# Patient Record
Sex: FEMALE | Race: WHITE | NOT HISPANIC OR LATINO | Employment: FULL TIME | URBAN - METROPOLITAN AREA
[De-identification: names, ages, dates, MRNs, and addresses within clinical notes are randomized per-mention and may not be internally consistent; named-entity substitution may affect disease eponyms.]

---

## 2021-05-22 ENCOUNTER — IMMUNIZATIONS (OUTPATIENT)
Dept: FAMILY MEDICINE CLINIC | Facility: HOSPITAL | Age: 26
End: 2021-05-22

## 2021-05-22 DIAGNOSIS — Z23 ENCOUNTER FOR IMMUNIZATION: Primary | ICD-10-CM

## 2021-05-22 PROCEDURE — 91300 SARS-COV-2 / COVID-19 MRNA VACCINE (PFIZER-BIONTECH) 30 MCG: CPT

## 2021-05-22 PROCEDURE — 0001A SARS-COV-2 / COVID-19 MRNA VACCINE (PFIZER-BIONTECH) 30 MCG: CPT

## 2021-06-16 ENCOUNTER — IMMUNIZATIONS (OUTPATIENT)
Dept: FAMILY MEDICINE CLINIC | Facility: HOSPITAL | Age: 26
End: 2021-06-16

## 2021-06-16 DIAGNOSIS — Z23 ENCOUNTER FOR IMMUNIZATION: Primary | ICD-10-CM

## 2021-06-16 PROCEDURE — 0002A SARS-COV-2 / COVID-19 MRNA VACCINE (PFIZER-BIONTECH) 30 MCG: CPT

## 2021-06-16 PROCEDURE — 91300 SARS-COV-2 / COVID-19 MRNA VACCINE (PFIZER-BIONTECH) 30 MCG: CPT

## 2021-09-03 ENCOUNTER — HOSPITAL ENCOUNTER (OUTPATIENT)
Dept: ULTRASOUND IMAGING | Facility: HOSPITAL | Age: 26
Discharge: HOME | End: 2021-09-03
Payer: COMMERCIAL

## 2021-09-03 DIAGNOSIS — R20.8 OTHER DISTURBANCES OF SKIN SENSATION (CODE): ICD-10-CM

## 2021-09-03 PROCEDURE — 76536 US EXAM OF HEAD AND NECK: CPT

## 2022-11-29 ENCOUNTER — OFFICE VISIT (OUTPATIENT)
Dept: URGENT CARE | Facility: CLINIC | Age: 27
End: 2022-11-29

## 2022-11-29 VITALS
TEMPERATURE: 98.8 F | HEART RATE: 82 BPM | DIASTOLIC BLOOD PRESSURE: 57 MMHG | RESPIRATION RATE: 20 BRPM | SYSTOLIC BLOOD PRESSURE: 116 MMHG

## 2022-11-29 DIAGNOSIS — H02.89 PAIN OF LEFT EYELID: Primary | ICD-10-CM

## 2022-11-29 RX ORDER — ERYTHROMYCIN 5 MG/G
0.5 OINTMENT OPHTHALMIC
Qty: 3.5 G | Refills: 0 | Status: SHIPPED | OUTPATIENT
Start: 2022-11-29 | End: 2022-12-06

## 2022-11-29 NOTE — PROGRESS NOTES
[facility]        NAME: Braulio Pinzon is a 24 y o  female  : 1995    MRN: [id number]  DATE: 2022  TIME: 6:04 PM    Assessment and Plan   Pain of left eyelid [H02 89]  1  Pain of left eyelid  erythromycin (ILOTYCIN) ophthalmic ointment       51-year-old female presents for evaluation of left upper lateral eyelid pain  No obvious foreign body or corneal abrasion on fluorescein stain exam   Patient advised to apply warm compresses, erythromycin ointment ordered  Follow-up with ophthalmologist as soon as possible  Present for immediate re-evaluation if you experience eye pain, redness or swelling of the eyelid  Patient Instructions    Blepharitis   WHAT YOU NEED TO KNOW:   Blepharitis is inflammation of one or both eyelids  Your eyelid, eyelashes, oil glands, or whites of the eye may be affected  DISCHARGE INSTRUCTIONS:   Return to the emergency department if:   • You have severe pain      • You have vision loss      Call your doctor or ophthalmologist if:   • Your vision changes      • Your signs and symptoms return or get worse, even after treatment      • You have a lump on your eyelid      • You have a pus-filled sore on your eyelid      • You have questions or concerns about your condition or care      Medicines:   • Medicines  can help decrease pain and swelling, or treat an infection      • Take your medicine as directed  Contact your healthcare provider if you think your medicine is not helping or if you have side effects  Tell him or her if you are allergic to any medicine  Keep a list of the medicines, vitamins, and herbs you take  Include the amounts, and when and why you take them  Bring the list or the pill bottles to follow-up visits   Carry your medicine list with you in case of an emergency      Manage blepharitis:  Always wash your hands with soap and water before and after eye care:     • Use artificial tears  2 times each day if you have dry eye      • Apply a warm compress  for 10 minutes 1 to 2 times each day, or as directed  This will loosen crusts and decrease itching and burning      • Gently scrub your upper and lower eyelid  2 times each day  This will help open your clogged oil glands and remove pus or other material stuck to your eyelid  Your healthcare provider may recommend a cleaning solution to buy  You can instead make one by putting 2 to 3 drops of baby shampoo in ½ cup warm water      • Massage your upper and lower eyelid in small circles for 5 seconds  to loosen oil plugs and decrease inflammation      • Do not wear contact lenses or eye makeup  until your eye has healed      Follow up with your doctor or ophthalmologist as directed:  Write down your questions so you remember to ask them during your visits  © Copyright [employer] 2022 Information is for End User's use only and may not be sold, redistributed or otherwise used for commercial purposes  All illustrations and images included in CareNotes® are the copyrighted property of [employer]  or [employer]   The above information is an  only  It is not intended as medical advice for individual conditions or treatments  Talk to your doctor, nurse or pharmacist before following any medical regimen to see if it is safe and effective for you            Follow up with PCP in 3-5 days  Proceed to  ER if symptoms worsen  Chief Complaint     Chief Complaint   Patient presents with   • Eye Problem     Discomfort in L upper eyelid which started a few days ago  No visual issues  History of Present Illness       Patient is a 82-year-old female with no significant past medical history presents for evaluation of left lateral upper eyelid pain since Sunday night  She wears glasses, but does not wear contacts  She denies injury or trauma to the eye, blurred vision or other vision changes, pain with eye movement, drainage from the eye    The pain is aggravated by closing her eye       Review of Systems   Review of Systems   Constitutional: Negative for fatigue and fever  HENT: Negative for congestion, ear discharge, ear pain, postnasal drip, rhinorrhea, sinus pressure, sinus pain, sneezing and sore throat  Eyes: Negative  Negative for photophobia, pain, discharge, redness, itching and visual disturbance  Eyelid pain   Respiratory: Negative  Negative for apnea, cough, choking, chest tightness, shortness of breath, wheezing and stridor  Cardiovascular: Negative  Negative for chest pain and palpitations  Gastrointestinal: Negative  Negative for diarrhea, nausea and vomiting  Endocrine: Negative  Negative for polydipsia, polyphagia and polyuria  Genitourinary: Negative  Negative for decreased urine volume and flank pain  Musculoskeletal: Negative  Negative for arthralgias, back pain, myalgias, neck pain and neck stiffness  Skin: Negative  Negative for color change and rash  Allergic/Immunologic: Negative  Negative for environmental allergies  Neurological: Negative  Negative for dizziness, facial asymmetry, light-headedness, numbness and headaches  Hematological: Negative  Negative for adenopathy  Psychiatric/Behavioral: Negative  Current Medications       Current Outpatient Medications:   •  erythromycin (ILOTYCIN) ophthalmic ointment, Administer 0 5 inches into the left eye daily at bedtime for 7 days, Disp: 3 5 g, Rfl: 0  •  sertraline (ZOLOFT) 50 mg tablet, Take 50 mg by mouth daily, Disp: , Rfl:     Current Allergies     Allergies as of 11/29/2022   • (No Known Allergies)            The following portions of the patient's history were reviewed and updated as appropriate: allergies, current medications, past family history, past medical history, past social history, past surgical history and problem list      No past medical history on file  No past surgical history on file  No family history on file        Medications have been verified  Objective   /57   Pulse 82   Temp 98 8 °F (37 1 °C) (Temporal)   Resp 20        Physical Exam     Physical Exam  Vitals and nursing note reviewed  Constitutional:       General: She is not in acute distress  Appearance: Normal appearance  She is not ill-appearing, toxic-appearing or diaphoretic  Interventions: She is not intubated  HENT:      Head: Normocephalic and atraumatic  Right Ear: Tympanic membrane, ear canal and external ear normal  There is no impacted cerumen  Left Ear: Tympanic membrane, ear canal and external ear normal  There is no impacted cerumen  Nose: Nose normal  No congestion or rhinorrhea  Mouth/Throat:      Mouth: Mucous membranes are moist       Pharynx: No oropharyngeal exudate or posterior oropharyngeal erythema  Eyes:      General: Lids are normal  Lids are everted, no foreign bodies appreciated  Left eye: No foreign body, discharge or hordeolum  Extraocular Movements: Extraocular movements intact  Left eye: Normal extraocular motion and no nystagmus  Conjunctiva/sclera: Conjunctivae normal       Left eye: Left conjunctiva is not injected  No chemosis, exudate or hemorrhage  Pupils: Pupils are equal, round, and reactive to light  Cardiovascular:      Rate and Rhythm: Normal rate and regular rhythm  Pulses: Normal pulses  Heart sounds: Normal heart sounds, S1 normal and S2 normal  Heart sounds not distant  No murmur heard  No friction rub  No gallop  Pulmonary:      Effort: Pulmonary effort is normal  No tachypnea, bradypnea, accessory muscle usage, prolonged expiration, respiratory distress or retractions  She is not intubated  Breath sounds: Normal breath sounds  No stridor, decreased air movement or transmitted upper airway sounds  No decreased breath sounds, wheezing, rhonchi or rales  Abdominal:      General: Bowel sounds are normal       Palpations: Abdomen is soft  Tenderness: There is no abdominal tenderness  There is no guarding or rebound  Musculoskeletal:         General: Normal range of motion  Cervical back: Full passive range of motion without pain, normal range of motion and neck supple  No rigidity or tenderness  No spinous process tenderness or muscular tenderness  Normal range of motion  Lymphadenopathy:      Cervical: No cervical adenopathy  Right cervical: No superficial cervical adenopathy  Left cervical: No superficial cervical adenopathy  Skin:     General: Skin is warm and dry  Capillary Refill: Capillary refill takes less than 2 seconds  Neurological:      General: No focal deficit present  Mental Status: She is alert and oriented to person, place, and time  Cranial Nerves: No cranial nerve deficit     Psychiatric:         Mood and Affect: Mood normal          Behavior: Behavior normal

## 2023-05-12 ENCOUNTER — OFFICE VISIT (OUTPATIENT)
Dept: URGENT CARE | Facility: CLINIC | Age: 28
End: 2023-05-12

## 2023-05-12 VITALS
TEMPERATURE: 98.4 F | DIASTOLIC BLOOD PRESSURE: 52 MMHG | WEIGHT: 120 LBS | OXYGEN SATURATION: 96 % | RESPIRATION RATE: 16 BRPM | BODY MASS INDEX: 21.95 KG/M2 | SYSTOLIC BLOOD PRESSURE: 104 MMHG | HEART RATE: 86 BPM

## 2023-05-12 DIAGNOSIS — R51.9 NONINTRACTABLE HEADACHE, UNSPECIFIED CHRONICITY PATTERN, UNSPECIFIED HEADACHE TYPE: Primary | ICD-10-CM

## 2023-05-12 NOTE — PROGRESS NOTES
"  [facility]        NAME: Janine Jensen is a 24 y o  female  : 1995    MRN: [id number]  DATE: May 12, 2023  TIME: 7:35 PM    Assessment and Plan   Nonintractable headache, unspecified chronicity pattern, unspecified headache type [R51 9]  1  Nonintractable headache, unspecified chronicity pattern, unspecified headache type              Patient Instructions       Follow up with PCP in 3-5 days  Proceed to  ER if symptoms worsen  Chief Complaint     Chief Complaint   Patient presents with   • Headache     States she has been getting headaches for the last couple months  States she feels lightheaded when she has one  Does not have a headache now  Feels she may be anemic  Concerns about being pale  History of Present Illness       Patient is a 82-year-old female presenting for evaluation of daily headaches and fatigue  She states she has been having a headache daily for the past several months  It is intermittent and is primarily in the left frontal area  Over the past 1 to 2 weeks she has developed fatigue  She reports chills but denies fever  Denies arthralgias or myalgias  No visual changes  No recent cold symptoms  She uses Advil that \"sometimes\" offers relief  Headache generally improves with rest   She is concerned that she has anemia and has scheduled a follow-up visit with her family doctor but is unable to be evaluated until   Review of Systems   Review of Systems   Constitutional: Positive for fatigue  Negative for activity change, chills and fever  HENT: Negative for congestion, ear pain, postnasal drip, rhinorrhea, sinus pain and sore throat  Gastrointestinal: Negative for abdominal pain, diarrhea, nausea and vomiting  Skin: Negative for rash  Neurological: Positive for headaches           Current Medications       Current Outpatient Medications:   •  sertraline (ZOLOFT) 50 mg tablet, Take 50 mg by mouth daily, Disp: , Rfl:     Current Allergies " Allergies as of 05/12/2023   • (No Known Allergies)            The following portions of the patient's history were reviewed and updated as appropriate: allergies, current medications, past family history, past medical history, past social history, past surgical history and problem list      No past medical history on file  No past surgical history on file  No family history on file  Medications have been verified  Objective   /52   Pulse 86   Temp 98 4 °F (36 9 °C) (Temporal)   Resp 16   Wt 54 4 kg (120 lb)   SpO2 96%   BMI 21 95 kg/m²        Physical Exam     Physical Exam  Vitals reviewed  Constitutional:       General: She is awake  She is not in acute distress  Appearance: Normal appearance  She is normal weight  HENT:      Head: Normocephalic  Right Ear: Hearing normal       Left Ear: Hearing normal    Cardiovascular:      Rate and Rhythm: Normal rate  Pulmonary:      Effort: Pulmonary effort is normal    Skin:     General: Skin is warm and moist    Neurological:      General: No focal deficit present  Mental Status: She is alert and oriented to person, place, and time  Psychiatric:         Behavior: Behavior is cooperative

## 2023-11-28 NOTE — PROGRESS NOTES
Diagnoses and all orders for this visit:    Encounter for annual routine gynecological examination  -     Liquid-based pap, screening        Perineal hygiene reviewed   Weight bearing exercises minium of 150 mins/weekly advised. Kegel exercises recommended daily, see AVS for instructions and recommendations  SBE encouraged, ASCCP guidelines reviewed. Condoms encouraged with all sexual activity to prevent STI's. Gardisil vaccines recommended up to age 39  Calcium/ Vit D dietary requirements discussed,   Advised to call with any issues,  all concerns & questions addressed. See provided information in your after visit summary     F/U Annually and PRN      Health Maintenance:    Last PAP: Not on file Not Collect Today, unable to tolerate speculum exam will try at annual next year, we discussed her trying to use a tampon throughout the year to help her feel more comfortable with penetration       Last Mammogram: Not on file  advised age 38     Last Colonoscopy: Not on file    advised age 41    Gardisil: Not completed Gardasil 9 was advised for prevention of HPV-related disease. We discussed risks/benefits, SE's/AE's at length and all questions were answered. Written info was provided for review. The patient agrees to consider and is aware she may call to schedule injection #1 at any time. Subjective    CC: Yearly Exam, New Patient      Salbador Lopez is a 34 y.o. female here for an annual exam. No obstetric history on file. GYN hx includes:  no issues   No personal Hx of breast, cervical, ovarian or colon CA.    Family hx of: PGM- breast cancer early 42's alive, no genetic testing done   Genetic oncology referral placed for patient   Medically stable, reports no changes in medical Hx, follows with PMD  Pt s mother at visit today, pt does have anxiety and tactile issues, she was not able to tolerate the speculum today, pediatric spec attempted, we discussed trying at next years visit, Latha Lema has never been sexually active and does not have any pertinent risk factors at this time, ok to wait until next year   Patient's last menstrual period was 11/15/2023 (exact date). Her menstrual cycles are regular every 28-30 days. She denies issues with bleeding during her menses. She denies breast concerns, abnormal vaginal discharge, vaginal itching, odor, irritation, bowel/bladder dysfunction, urinary symptoms, pelvic pain,  She is not sexually active. Never has been  She does not want STD testing today. Live at home   Works at [employer]     History reviewed. No pertinent past medical history. Past Surgical History:   Procedure Laterality Date    WISDOM TOOTH EXTRACTION         Immunization History   Administered Date(s) Administered    COVID-19 PFIZER VACCINE 0.3 ML IM 2021, 2021       Family History   Problem Relation Age of Onset    No Known Problems Mother     No Known Problems Father     No Known Problems Brother      Social History     Tobacco Use    Smoking status: Never    Smokeless tobacco: Never   Vaping Use    Vaping Use: Never used   Substance Use Topics    Drug use: Never       Current Outpatient Medications:     sertraline (ZOLOFT) 50 mg tablet, Take 50 mg by mouth daily, Disp: , Rfl:   Patient Active Problem List    Diagnosis Date Noted    Anxiety 2023       No Known Allergies    OB History    Para Term  AB Living   0 0 0 0 0 0   SAB IAB Ectopic Multiple Live Births   0 0 0 0 0       Vitals:    23 0902   BP: 118/70   BP Location: Left arm   Patient Position: Sitting   Cuff Size: Large   Weight: 59.9 kg (132 lb)   Height: 5' 2" (1.575 m)     Body mass index is 24.14 kg/m². Review of Systems     Constitutional: Negative for chills, fatigue, fever, headaches, visual disturbances, and unexpected weight change. Respiratory: Negative for cough, & shortness of breath. Cardiovascular: Negative for chest pain.  .    Gastrointestinal: Negative for Abd pain, nausea & vomiting, constipation and diarrhea. Genitourinary: Negative for difficulty urinating, dysuria, hematuria, dyspareunia, unusual vaginal bleeding or discharge  Skin: Negative skin changes    Physical Exam     Constitutional: Alert & Oriented x3, well-developed and well-nourished. No distress. HENT: Atraumatic, Normocephalic, Conjunctivae clear  Neck: Normal range of motion. Neck supple. No thyromegaly, mass, nodules or tenderness  Pulmonary: Effort normal.   Abdominal: Soft. No tenderness or masses  Musculoskeletal: Normal ROM  Skin: Warm & Dry  Psychological: Normal mood, thought content, behavior & judgement     Breasts:   Right: tissue soft without masses, tenderness, skin changes or nipple discharge. No areas of erythema or pain. No subclavicular, axillary, pectoral adenopathy  Left:  tissue soft without masses, tenderness, skin changes or nipple discharge. No areas of erythema or pain. No subclavicular, axillary, pectoral adenopathy    Pelvic exam was performed with patient supine, lithotomy position. Labia: Negative rash, tenderness, lesion or injury on the right labia. Negative rash, tenderness, lesion or injury on the left labia. Urethral meatus:  Negative for  tenderness, inflammation or discharge. Unable to tolerate speculum exam   Uterus:   Cervix: . Left adnexa:   Vagina:   Perineum without lesions, signs of injury, erythema or swelling. Inguinal Canal:        Right: No inguinal adenopathy or hernia present. Left: No inguinal adenopathy or hernia present.

## 2023-11-29 PROBLEM — F41.9 ANXIETY: Status: ACTIVE | Noted: 2023-11-29

## 2023-11-29 NOTE — PATIENT INSTRUCTIONS
Breast Self Exam for Women   AMBULATORY CARE:   A breast self-exam (BSE)  is a way to check your breasts for lumps and other changes. Regular BSEs can help you know how your breasts normally look and feel. Most breast lumps or changes are not cancer, but you should always have them checked by a healthcare provider. Why you should do a BSE:  Breast cancer is the most common type of cancer in women. Even if you have mammograms, you may still want to do a BSE regularly. If you know how your breasts normally feel and look, it may help you know when to contact your healthcare provider. Mammograms can miss some cancers. You may find a lump during a BSE that did not show up on a mammogram.  When you should do a BSE:  If you have periods, you may want to do your BSE 1 week after your period ends. This is the time when your breasts may be the least swollen, lumpy, or tender. You can do regular BSEs even if you are breastfeeding or have breast implants. Call your doctor if:   You find any lumps or changes in your breasts. You have breast pain or fluid coming from your nipples. You have questions or concerns about your condition or care. How to do a BSE:       Look at your breasts in a mirror. Look at the size and shape of each breast and nipple. Check for swelling, lumps, dimpling, scaly skin, or other skin changes. Look for nipple changes, such as a nipple that is painful or beginning to pull inward. Gently squeeze both nipples and check to see if fluid (that is not breast milk) comes out of them. If you find any of these or other breast changes, contact your healthcare provider. Check your breasts while you sit or  the following 3 positions:    Frederic Net your arms down at your sides. Raise your hands and join them behind your head. Put firm pressure with your hands on your hips. Bend slightly forward while you look at your breasts in the mirror. Lie down and feel your breasts.   When you lie down, your breast tissue spreads out evenly over your chest. This makes it easier for you to feel for lumps and anything that may not be normal for your breasts. Do a BSE on one breast at a time. Place a small pillow or towel under your left shoulder. Put your left arm behind your head. Use the 3 middle fingers of your right hand. Use your fingertip pads, on the top of your fingers. Your fingertip pad is the most sensitive part of your finger. Use small circles to feel your breast tissue. Use your fingertip pads to make dime-sized, overlapping circles on your breast and armpits. Use light, medium, and firm pressure. First, press lightly. Second, press with medium pressure to feel a little deeper into the breast. Last, use firm pressure to feel deep within your breast.    Examine your entire breast area. Examine the breast area from above the breast to below the breast where you feel only ribs. Make small circles with your fingertips, starting in the middle of your armpit. Make circles going up and down the breast area. Continue toward your breast and all the way across it. Examine the area from your armpit all the way over to the middle of your chest (breastbone). Stop at the middle of your chest.    Move the pillow or towel to your right shoulder, and put your right arm behind your head. Use the 3 fingertip pads of your left hand, and repeat the above steps to do a BSE on your right breast.  What else you can do to check for breast problems or cancer:  Talk to your healthcare provider about mammograms. A mammogram is an x-ray of your breasts to screen for breast cancer or other problems. Your provider can tell you the benefits and risks of mammograms. The first mammogram is usually at age 39 or 48. Your provider may recommend you start at 36 or younger if your risk for breast cancer is high. Mammograms usually continue every 1 to 2 years until age 76.        Follow up with your doctor as directed:  Write down your questions so you remember to ask them during your visits. © Copyright Yolanda Martínez 2023 Information is for End User's use only and may not be sold, redistributed or otherwise used for commercial purposes. The above information is an  only. It is not intended as medical advice for individual conditions or treatments. Talk to your doctor, nurse or pharmacist before following any medical regimen to see if it is safe and effective for you. Wellness Visit for Adults   AMBULATORY CARE:   A wellness visit  is when you see your healthcare provider to get screened for health problems. Your healthcare provider will also give you advice on how to stay healthy. Write down your questions so you remember to ask them. Ask your healthcare provider how often you should have a wellness visit. What happens at a wellness visit:  Your healthcare provider will ask about your health, and your family history of health problems. This includes high blood pressure, heart disease, and cancer. He or she will ask if you have symptoms that concern you, if you smoke, and about your mood. You may also be asked about your intake of medicines, supplements, food, and alcohol. Any of the following may be done: Your weight  will be checked. Your height may also be checked so your body mass index (BMI) can be calculated. Your BMI shows if you are at a healthy weight. Your blood pressure  and heart rate will be checked. Your temperature may also be checked. Blood and urine tests  may be done. Blood tests may be done to check your cholesterol levels. Abnormal cholesterol levels increase your risk for heart disease and stroke. You may also need a blood or urine test to check for diabetes if you are at increased risk. Urine tests may be done to look for signs of an infection or kidney disease. A physical exam  includes checking your heartbeat and lungs with a stethoscope.  Your healthcare provider may also check your skin to look for sun damage. Screening tests  may be recommended. A screening test is done to check for diseases that may not cause symptoms. The screening tests you may need depend on your age, gender, family history, and lifestyle habits. For example, colorectal screening may be recommended if you are 48years old or older. Screening tests you need if you are a woman:   A Pap smear  is used to screen for cervical cancer. Pap smears are usually done every 3 to 5 years depending on your age. You may need them more often if you have had abnormal Pap smear test results in the past. Ask your healthcare provider how often you should have a Pap smear. A mammogram  is an x-ray of your breasts to screen for breast cancer. Experts recommend mammograms every 2 years starting at age 48 years. You may need a mammogram at age 52 years or younger if you have an increased risk for breast cancer. Talk to your healthcare provider about when you should start having mammograms and how often you need them. Vaccines you may need:   Get an influenza vaccine  every year. The influenza vaccine protects you from the flu. Several types of viruses cause the flu. The viruses change over time, so new vaccines are made each year. Get a tetanus-diphtheria (Td) booster vaccine  every 10 years. This vaccine protects you against tetanus and diphtheria. Tetanus is a severe infection that may cause painful muscle spasms and lockjaw. Diphtheria is a severe bacterial infection that causes a thick covering in the back of your mouth and throat. Get a human papillomavirus (HPV) vaccine  if you are female and aged 23 to 32 or male 23 to 24 and never received it. This vaccine protects you from HPV infection. HPV is the most common infection spread by sexual contact. HPV may also cause vaginal, penile, and anal cancers. Get a pneumococcal vaccine  if you are aged 72 years or older.  The pneumococcal vaccine is an injection given to protect you from pneumococcal disease. Pneumococcal disease is an infection caused by pneumococcal bacteria. The infection may cause pneumonia, meningitis, or an ear infection. Get a shingles vaccine  if you are 60 or older, even if you have had shingles before. The shingles vaccine is an injection to protect you from the varicella-zoster virus. This is the same virus that causes chickenpox. Shingles is a painful rash that develops in people who had chickenpox or have been exposed to the virus. How to eat healthy:  My Plate is a model for planning healthy meals. It shows the types and amounts of foods that should go on your plate. Fruits and vegetables make up about half of your plate, and grains and protein make up the other half. A serving of dairy is included on the side of your plate. The amount of calories and serving sizes you need depends on your age, gender, weight, and height. Examples of healthy foods are listed below:  Eat a variety of vegetables  such as dark green, red, and orange vegetables. You can also include canned vegetables low in sodium (salt) and frozen vegetables without added butter or sauces. Eat a variety of fresh fruits , canned fruit in 100% juice, frozen fruit, and dried fruit. Include whole grains. At least half of the grains you eat should be whole grains. Examples include whole-wheat bread, wheat pasta, brown rice, and whole-grain cereals such as oatmeal.    Eat a variety of protein foods such as seafood (fish and shellfish), lean meat, and poultry without skin (turkey and chicken). Examples of lean meats include pork leg, shoulder, or tenderloin, and beef round, sirloin, tenderloin, and extra lean ground beef. Other protein foods include eggs and egg substitutes, beans, peas, soy products, nuts, and seeds. Choose low-fat dairy products such as skim or 1% milk or low-fat yogurt, cheese, and cottage cheese. Limit unhealthy fats  such as butter, hard margarine, and shortening. Exercise:  Exercise at least 30 minutes per day on most days of the week. Some examples of exercise include walking, biking, dancing, and swimming. You can also fit in more physical activity by taking the stairs instead of the elevator or parking farther away from stores. Include muscle strengthening activities 2 days each week. Regular exercise provides many health benefits. It helps you manage your weight, and decreases your risk for type 2 diabetes, heart disease, stroke, and high blood pressure. Exercise can also help improve your mood. Ask your healthcare provider about the best exercise plan for you. General health and safety guidelines:   Do not smoke. Nicotine and other chemicals in cigarettes and cigars can cause lung damage. Ask your healthcare provider for information if you currently smoke and need help to quit. E-cigarettes or smokeless tobacco still contain nicotine. Talk to your healthcare provider before you use these products. Limit alcohol. A drink of alcohol is 12 ounces of beer, 5 ounces of wine, or 1½ ounces of liquor. Lose weight, if needed. Being overweight increases your risk of certain health conditions. These include heart disease, high blood pressure, type 2 diabetes, and certain types of cancer. Protect your skin. Do not sunbathe or use tanning beds. Use sunscreen with a SPF 15 or higher. Apply sunscreen at least 15 minutes before you go outside. Reapply sunscreen every 2 hours. Wear protective clothing, hats, and sunglasses when you are outside. Drive safely. Always wear your seatbelt. Make sure everyone in your car wears a seatbelt. A seatbelt can save your life if you are in an accident. Do not use your cell phone when you are driving. This could distract you and cause an accident. Pull over if you need to make a call or send a text message. Practice safe sex. Use latex condoms if are sexually active and have more than one partner.  Your healthcare provider may recommend screening tests for sexually transmitted infections (STIs). Wear helmets, lifejackets, and protective gear. Always wear a helmet when you ride a bike or motorcycle, go skiing, or play sports that could cause a head injury. Wear protective equipment when you play sports. Wear a lifejacket when you are on a boat or doing water sports. © Copyright Deanne Sin 2023 Information is for End User's use only and may not be sold, redistributed or otherwise used for commercial purposes. The above information is an  only. It is not intended as medical advice for individual conditions or treatments. Talk to your doctor, nurse or pharmacist before following any medical regimen to see if it is safe and effective for you. Safe Sex Practices   AMBULATORY CARE:   Safe sex practices  are ways to prevent pregnancy and the spread of sexually transmitted infections (STIs). An STI happens when a virus or bacteria are spread through sexual activity. Safe sex practices help decrease or prevent body fluid exchange during sex. Body fluids include saliva, urine, blood, vaginal fluids, and semen. Oral, vaginal, and anal sex can all spread STIs. Seek care immediately if:   A condom breaks, leaks, or slips off while you are having sex. You notice sores on your penis, vagina, anal area, or skin around them. You have had unsafe sex and want to discuss emergency contraception or treatment for STI exposure. Call your doctor if:   You or your female sex partner might be pregnant. You have questions or concerns about your condition or care. Safe sex practices to follow before you have sex:   Talk to a new partner before you have sex. Tell your partner if you have an STI. Ask about his or her sex history and if he or she has a current or past STI. Your partner may need to be tested and treated.  Do not have sex while you are being treated for an STI, or with a partner who is being treated. Limit your number of sex partners. More than one sex partner can increase your risk for an STI. Do not have sex with anyone whose sex history you do not know. Get tested for STIs if needed. Get tested if you had sex with someone who has an STI. Get tested if you have unprotected sex with any new partner. Talk to your healthcare provider about birth control. Birth control can help prevent an unwanted pregnancy. There are many different types of birth control. Talk to your healthcare provider about which birth control method is right for you. Ask about medicines to lower your risk for some STIs:      Vaccines  can help protect you from hepatitis A, hepatitis B, and the human papillomavirus (HPV). The HPV vaccine is usually given at 11 years, but it may be given through 26 years to both females and males. Your provider can give you more information on vaccines to prevent STIs. Pre-exposure prophylaxis (PrEP)  may be given if you are at high risk for HIV. PrEP is taken every day to prevent the virus from fully infecting the body. Do not use alcohol or drugs before sex. These can prevent you from thinking clearly and increase your risk for unsafe sex. Safe sex practices to follow while you are having sex:   Use condoms and barrier methods for all types of sexual contact. This includes oral, vaginal, and anal sex. Male and female condoms are available. Make sure that the condom fits and is put on correctly. Rubber latex sheets or dental dams can be used for oral sex. Use a new condom or latex barrier each time you have sex. Use latex condoms, if possible. [place] or natural condoms do not prevent STIs. If you or your partner is allergic to latex, use a nonlatex product, such as polyurethane. Use a second form of birth control with the condom to prevent pregnancy and STIs. Do not use male and female condoms together. Only use water-based lubricants during sex.   Water-based lubricants help prevent sores or cuts in the vagina or on the penis. Prevent sores or cuts to decrease your risk for an STI. Do not use oil-based lubricants, such as baby oil or hand lotion, with latex condoms or barriers. These will weaken the latex and may cause the condom to break. Do not use chemicals that irritate your skin. Products that contain chemical irritants, such as spermicides, can irritate the lining of your vagina or rectum. Irritation may cause sores that can increase your risk for an STI. Be careful when you have sex if you have open sores or cuts. Open sores or cuts may increase your risk for an STI. Keep all open sores or cuts covered during sex. Do not have oral sex if you have cuts or sores in your mouth. Do not do activities that can pass germs. Do not use saliva as a lubricant or share sex toys. Follow up with your doctor as directed:  Write down your questions so you remember to ask them during your visits. © Copyright Samara Walker 2023 Information is for End User's use only and may not be sold, redistributed or otherwise used for commercial purposes. The above information is an  only. It is not intended as medical advice for individual conditions or treatments. Talk to your doctor, nurse or pharmacist before following any medical regimen to see if it is safe and effective for you. HPV (Human Papillomavirus)   AMBULATORY CARE:   Human Papillomavirus (HPV)  is the name for a group of viruses that can infect your skin or other parts of your body. HPV is the most common infection spread by sexual contact. It can also be spread from a mother to her baby during delivery. Common symptoms include the following:   Painless warts in your mouth or on your genitals    Genital or anal discharge, bleeding, itching, or pain    Pain when you urinate    Call your doctor if:   You have new or worsening symptoms. You have questions or concerns about your condition or care.     HPV diagnosis:  Your healthcare provider may use a vinegar liquid to help diagnose HPV genital warts. Women 27to 72years old can be checked for HPV during regular cervical cancer screenings. An HPV test checks for certain types of HPV that can cause changes in cervical cells. Without treatment, the changed cells can become cancer. An HPV test can be done every 5 years if the results show no infection. The test can be done with or without a Pap smear. A Pap smear checks for cancer or for abnormal cells that can become cancer. You may be tested for HPV if you have mouth or throat cancer. Treatment:  HPV cannot be cured, but an infection may go away on its own in about 2 years without causing problems. If the infection continues, some types of HPV can lead to health conditions that need to be treated. Examples include warts and certain cancers, especially squamous cell carcinoma (SCC). HPV-linked SCCs commonly develop in the anus, throat (called oropharyngeal cancer), cervix, vagina, penis, or mouth. HPV can also cause a type of cervical cancer called adenocarcinoma. Symptoms of any of these conditions may not develop for several years after you were exposed to HPV. You will need to be monitored closely. Ask your healthcare provider for more information about monitoring, conditions caused by HPV, and available treatments. Prevent an HPV infection:  HPV is usually spread through sexual activity. The following can help prevent infection:  Ask about the HPV vaccine. The HPV vaccine is given to females and males, usually at 6or 15years of age. It can be given from 9 years through 39years of age, if needed. It is most effective if given before sexual activity begins. Use a new condom, contraceptive barrier, or dental dam each time you have sex. This includes oral, vaginal, and anal sex. Talk to your healthcare provider if you have any questions about what to use or how to use it.     Follow up with your doctor as directed:  Write down your questions so you remember to ask them during your visits. © Copyright Kobi Key 2023 Information is for End User's use only and may not be sold, redistributed or otherwise used for commercial purposes. The above information is an  only. It is not intended as medical advice for individual conditions or treatments. Talk to your doctor, nurse or pharmacist before following any medical regimen to see if it is safe and effective for you. Perineal Hygiene      Your vaginal naturally takes care of its self, it is a self washing system, the less you mess the healthier it will be     No soaps or feminine wash to the vulva, these products can cause dermitis, bacterial infections and other vulvar problems. Use only water to cleanse, or water with Dove or [employer] if necessary. No scented lotions or products are advised in or near your vulva. Use only coconut oil for moisture if needed. No douching this may cause imbalance in your vaginal PH and further issues. If you wear panty liners, you may apply a thin coating of Vaseline, A&D ointment or coconut oil to the vulvar tissues as a skin barrier     [place]otton underware, loose fitting clothing  Only perfume-free, dye-free laundry detergent, use a second rinse cycle   Avoid fabric softeners/dryer sheets. Your partner should avoid the same products as well. Over the counter probiotic to restore vaginal della may be helpful as well, take daily. You may also look into Boric Acid vaginal suppositories to restore vaginal PH balance for up to 2 weeks as directed on the box. You may not use these if you are pregnant      For vaginal dryness: You may use:     Coconut oil (organic, pure, unscented) as needed for moisture or lubrication.  ( Do not use if allergic)       Replens moisture restore external comfort gel daily ( use as directed on the box)        Replens long lasting vaginal moisturizer  ( use as directed on the box)         For Vaginal Lubrication:          You may use:     Coconut oil (organic, pure, unscented) as a lubricant or another scent-free lubricant (Astroglide, Uberlube) if needed. Do not use coconut oil or silicone if using a condom as this may break down the integrity of the condom and cause an unplanned pregnancy              Do not use coconut oil if allergic               Replens silky smooth lubricant, premium silicone based lubricant for intercourse. ( use as directed, a small amount will provide an enhanced natural feeling)     Any premium over the counter vaginal lubricant water or silicone based. Silicone based will have more staying power.

## 2023-11-30 ENCOUNTER — TELEPHONE (OUTPATIENT)
Dept: OBGYN CLINIC | Facility: CLINIC | Age: 28
End: 2023-11-30

## 2023-11-30 ENCOUNTER — OFFICE VISIT (OUTPATIENT)
Dept: OBGYN CLINIC | Facility: CLINIC | Age: 28
End: 2023-11-30
Payer: COMMERCIAL

## 2023-11-30 VITALS
BODY MASS INDEX: 24.29 KG/M2 | HEIGHT: 62 IN | SYSTOLIC BLOOD PRESSURE: 118 MMHG | WEIGHT: 132 LBS | DIASTOLIC BLOOD PRESSURE: 70 MMHG

## 2023-11-30 DIAGNOSIS — Z80.3 FAMILY HX-BREAST MALIGNANCY: ICD-10-CM

## 2023-11-30 DIAGNOSIS — Z01.419 ENCOUNTER FOR ANNUAL ROUTINE GYNECOLOGICAL EXAMINATION: Primary | ICD-10-CM

## 2023-11-30 PROCEDURE — S0610 ANNUAL GYNECOLOGICAL EXAMINA: HCPCS | Performed by: OBSTETRICS & GYNECOLOGY

## 2023-11-30 NOTE — TELEPHONE ENCOUNTER
Pt's mom requesting a cb from [employer]. Daughter has sensory issues and pap wasn't able to be done today so pt's mom is asking how insurance will be billed for visit. Pt's mom would like to talk to [employer] even if she can't answer the billing question.

## 2023-12-01 ENCOUNTER — TELEPHONE (OUTPATIENT)
Dept: HEMATOLOGY ONCOLOGY | Facility: CLINIC | Age: 28
End: 2023-12-01

## 2023-12-01 NOTE — TELEPHONE ENCOUNTER
I called Concha Vargas to schedule a new patient appointment with the [facility].      Outcome:   I left a voice message encouraging the patient to call the [facility] [place] at [phone] to schedule this appointment. A mychart message was also send with our contact information. Follow-up:   At this time the referral will be closed and we will wait to hear back from the patient regarding scheduling this appointment.

## 2023-12-06 NOTE — TELEPHONE ENCOUNTER
[facility] for [facility] that if she comes in sooner than the year for her daughter to try Pap smear again, we would do OVS as no charge, if pap obtained we would bill that portion to the lab as a covered service.

## 2024-08-19 ENCOUNTER — NURSE TRIAGE (OUTPATIENT)
Age: 29
End: 2024-08-19

## 2024-08-19 NOTE — TELEPHONE ENCOUNTER
"RN placed a call to patient. Pt got period today. States has been going through a lot of stress due to grandfather's illness. RN advised stress can impact cycle and make it irregular or even missed. Advised pt to wait and see if period regulates next month. If continues to be irregular or unusual - call back to schedule an appointment. Pt agreeable to plan. No further questions.  Reason for Disposition  • Recent stress (e.g., new school/job/home/marriage, relationship problems)    Answer Assessment - Initial Assessment Questions  1. LMP:  \"When did your last menstrual period begin?\"      NA  2. DAYS LATE: \"How many days late is your menstrual period?\"      9 days - but got today after call.   3. REGULARITY: \"How regular are your periods?\"      Regular  4. PREGNANCY: \"Is there any chance you are pregnant?\" (e.g., unprotected intercourse, missed birth control pill, broken condom) \"Have you used a home pregnancy test?\"      Denies  5. BREASTFEEDING: \"Are you breastfeeding?\"      NA  6. BIRTH CONTROL PILLS: \"Are you taking birth control pills, or have you stopped recently?\"      NA  7. DEPOPROVERA: \"Has your doctor given you a shot to prevent pregnancy?\" (e.g., Depoprovera injection)      NA  8. CAUSE: \"What do you think caused the missed period?\" (e.g., stress, rapid weight loss, excessive exercise)      Stress  9. OTHER SYMPTOMS: \"Do you have any other symptoms?\" (e.g., abdominal pain)      NA    Protocols used: Menstrual Period - Missed or Late-ADULT-[place]    "

## 2024-08-19 NOTE — TELEPHONE ENCOUNTER
Patient returning call from RN, tried CTS unsuccessful. Patient states she did get her menstrual today. Please call patient back.

## 2024-08-19 NOTE — TELEPHONE ENCOUNTER
Regarding: pt is 9 days late and not pregnant  ----- Message from Brittany PENA sent at 8/19/2024  8:22 AM EDT -----  Patient called and is 9 days late and is not pregnant and she said she is a virgin and she's definitely not pregnant and concerned why she hasn't got her period and the openings we have she cannot make this week.  Please call patient back at [phone]. Thank you

## 2025-01-14 NOTE — PROGRESS NOTES
Diagnoses and all orders for this visit:    Encounter for gynecological examination without abnormal finding    HPV vaccine counseling  -     HPV Vaccine 9 valent IM    Encounter for initial prescription of contraceptive pills  -     norethindrone-ethinyl estradiol (JUNEL FE 1/20) 1-20 MG-MCG per tablet; Take 1 tablet by mouth daily    Screening for cervical cancer  -     Liquid-based pap, screening      She would like to start on OCP, Medical and family hx reviewed for risk of VTE. Discussed benefits, side effects and risks of OCP use. Reviewed ACHES. Printed information given & reviewed instructions for how to take the pill .  RTO in 3 months for pill check      Return for Gardisil 2 & 3     Perineal hygiene reviewed   Weight bearing exercises minium of 150 mins/weekly advised.   Kegel exercises recommended daily, see AVS for instructions and recommendations  SBE encouraged, ASCCP guidelines reviewed. Condoms encouraged with all sexual activity to prevent STI's.   Gardisil vaccines recommended up to age 45  Calcium/ Vit D dietary requirements discussed,   Advised to call with any issues,  all concerns & questions addressed.   See after visit summary for further information and recommendations to the above mentioned subjects which we may or may not have covered in detail during your visit       Health Maintenance:    Last PAP: Not on file  Next PAP Due:collected today, blind sweep , pt had a hard time tolerating but did much better than last year, she is aware that we may need to repeat the pap     Last Mammogram: Not on file  advised age 40     Last Colonoscopy: Not on file    advised age 47    Gardisil:  Not completed Gardasil 9 was advised for prevention of HPV-related disease. We discussed risks/benefits, SE's/AE's at length and all questions were answered. Written info was provided for review. The patient agrees to consider and is aware she may call to schedule injection #1 at any time.    Would like to start  today       Subjective    CC: Yearly Exam      Juany Hernandez is a 29 y.o. female here for an annual exam.   GYN hx includes:  non contributory   Family hx of: PGM with BC early 40's no genetic testing was done  pt was given genetic referral last annual   Medically stable, reports no changes in medical Hx, follows with PMD    Patient's last menstrual period was 2024 (approximate).  Her menstrual cycles are regular every 28-30 days. She denies issues with bleeding during her menses.   She denies breast concerns, abnormal vaginal discharge, vaginal itching, odor, irritation, bowel/bladder dysfunction, urinary symptoms, pelvic pain today.   She is not sexually active. Just started dating Cj, this is possibly a serious relationship, she wants to start BC so that she can be ready for a sexual relationship she feels he is the one.  She does not want STD testing today.  Denies intimate partner violence    Working at [employer]     History reviewed. No pertinent past medical history.  Past Surgical History:   Procedure Laterality Date    WISDOM TOOTH EXTRACTION         Immunization History   Administered Date(s) Administered    COVID-19 PFIZER VACCINE 0.3 ML IM 2021, 2021    HPV9 2025       Family History   Problem Relation Age of Onset    No Known Problems Mother     No Known Problems Father     No Known Problems Brother     Breast cancer Paternal Grandmother      Social History     Tobacco Use    Smoking status: Never    Smokeless tobacco: Never   Vaping Use    Vaping status: Never Used   Substance Use Topics    Alcohol use: Never    Drug use: Never       Current Outpatient Medications:     norethindrone-ethinyl estradiol (JUNEL FE 1/20) 1-20 MG-MCG per tablet, Take 1 tablet by mouth daily, Disp: 90 tablet, Rfl: 0    sertraline (ZOLOFT) 50 mg tablet, Take 50 mg by mouth daily, Disp: , Rfl:   Patient Active Problem List    Diagnosis Date Noted    Anxiety 2023       No Known  "Allergies    OB History    Para Term  AB Living   0 0 0 0 0 0   SAB IAB Ectopic Multiple Live Births   0 0 0 0 0       Vitals:    25 1227   BP: 102/58   BP Location: Left arm   Patient Position: Sitting   Cuff Size: Standard   Weight: 54.9 kg (121 lb)   Height: 5' 2\" (1.575 m)     Body mass index is 22.13 kg/m².    Review of Systems     Constitutional: Negative for chills, fatigue, fever, headaches, visual disturbances, and unexpected weight change.   Respiratory: Negative for cough, & shortness of breath.  Cardiovascular: Negative for chest pain. .    Gastrointestinal: Negative for Abd pain, nausea & vomiting, constipation and diarrhea.   Genitourinary: Negative for difficulty urinating, dysuria, hematuria, unusual vaginal bleeding or discharge  Skin: Negative skin changes    Physical Exam     Constitutional: Alert & Oriented x3, well-developed and well-nourished. No distress.   HENT: Atraumatic, Normocephalic, Conjunctivae clear  Neck: Normal range of motion. Neck supple. No thyromegaly, mass, nodules or tenderness  Pulmonary: Effort normal.   Abdominal: Soft. No tenderness or masses  Musculoskeletal: Normal ROM  Skin: Warm & Dry  Psychological: Normal mood, thought content, behavior & judgement     Breasts:   Right: tissue soft without masses, tenderness, skin changes or nipple discharge. No areas of erythema or pain. No subclavicular, axillary, pectoral adenopathy  Left:  tissue soft without masses, tenderness, skin changes or nipple discharge. No areas of erythema or pain. No subclavicular, axillary, pectoral adenopathy    Pelvic exam was performed with patient supine, lithotomy position.   Blind sweep as pt did not tolerate the spec well.     Labia: Negative rash, tenderness, lesion or injury on the right labia.              Negative rash, tenderness, lesion or injury on the left labia.   Urethral meatus:  Negative for  tenderness, inflammation or discharge.   Uterus: not deviated, " enlarged, fixed or tender.   Cervix: No CMT, no discharge or friability.   Right adnexa: no mass, no tenderness and no fullness.  Left adnexa: no mass, no tenderness and no fullness.   Vagina: No erythema, tenderness, masses, or foreign body in the vagina. No signs of injury around the vagina. No unusual vaginal discharge   Perineum without lesions, signs of injury, erythema or swelling.  Inguinal Canal:        Right: No inguinal adenopathy or hernia present.        Left: No inguinal adenopathy or hernia present.

## 2025-01-14 NOTE — PATIENT INSTRUCTIONS
Birth Control Pills     Birth control pills  are also called oral contraceptives, or the pill. It is medicine that helps prevent pregnancy by stopping ovulation. Ovulation is when the ovaries make and release an egg cell each month. If this egg gets fertilized by sperm, pregnancy occurs. You will need to take the pill at the same time every day. Your healthcare provider will tell you when to start taking the pill. You will also be told what to do if you miss a dose. Instructions will depend on the kind of birth control pills you are taking.   Different kinds of birth control pills:  Some kinds are taken for 21 days in a row, followed by 7 days of placebo (no hormones) pills. Other kinds are taken for 24 days followed by 4 days of placebos. Each kind has a certain amount of female hormones. Your provider will decide on the kind that is best for you based on your age and other health conditions.  Call your local emergency number (911 in the ) for any of the following:   You have any of the following signs of a stroke:      Numbness or drooping on one side of your face     Weakness in an arm or leg    Confusion or difficulty speaking    Dizziness, a severe headache, or vision loss    You feel lightheaded, short of breath, and have chest pain.    You cough up blood.    Seek care immediately if:   Your arm or leg feels warm, tender, and painful. It may look swollen and red.    You have severe pain, numbness, or swelling in your arms or legs.    Contact your healthcare provider if:   You have forgotten to take a birth control pill.    You have mood changes, such as depression, since starting birth control pills.    You have nausea or are vomiting.    You have severe abdominal pain.    You missed a period and have questions or concerns about being pregnant.    You still have bleeding 4 months after taking birth control pills correctly.    You have questions or concerns about your condition or care.    What may be done  before you can start taking birth control pills:  You need to see your healthcare provider to get a prescription. Any of the following may be done before your healthcare provider gives you a prescription:  Your healthcare provider will ask about diseases and illnesses you have had in the past. Your provider will check your risk for blood clots, heart conditions, or stroke. Tell your provider if you had gastric bypass surgery. This surgery can affect the way your body absorbs medicines such as birth control pills.    Your provider will also check your blood pressure, and may do a breast and pelvic exam. A Pap smear may also be done during the pelvic exam. This is a test to make sure you do not have abnormal changes on your cervix. You may need other tests, such as a urine test to make sure you are not pregnant.    Your provider will ask if you take any medicines and if you smoke. Smoking increases your risk for stroke, heart attack, or a blood clot in your lungs. If you smoke, you should not take certain kinds of birth control pills.    Advantages of birth control pills:  When birth control pills are used correctly, the chances of getting pregnant are very low. Birth control pills may help decrease bleeding and pain during your monthly period. They may also help prevent cancer of the uterus and ovaries.  Disadvantages of birth control pills:  You may have sudden changes in your mood or feelings while you take birth control pills. You may have nausea and a decreased sex drive. You may have an increased appetite and rapid weight gain. You may also have bleeding in between periods, less frequent periods, vaginal dryness, and breast pain. Birth control pills will not protect you from sexually transmitted infections. Rarely, some birth control pills can increase your risk for a blood clot. This may become life-threatening.  If you decide you want to get pregnant:  If you are planning to have a baby, ask your healthcare  provider when you may stop taking your birth control pills. It may take some time for you to start ovulating again. Ask your healthcare provider for more information about pregnancy after birth control pills.  When to start taking birth control pills after you have a baby:  If you are not breastfeeding, you may start taking birth control pills 3 weeks after you give birth. You may be able to take certain types of birth control pills if you are breastfeeding. These pills can be started from 6 weeks to 6 months after you give birth. Ask your healthcare provider for more information about when to start taking birth control pills after you give birth.  What you need to know about birth control pills and menopause:   Talk with your healthcare provider if you want to take birth control pills around menopause.     Around age 45, you will enter into perimenopause. This means your hormone levels are dropping and you are ovulating less often. You can still become pregnant during this time. The risk for problems, such as miscarriage, are higher if you become pregnant after age 45. Birth control pills will prevent pregnancy, and may also help prevent or relieve some signs and symptoms of menopause. Examples are hot flashes and mood swings.     Your provider will do tests when you are around age 50. The tests may show that you are in menopause. If the tests do not show menopause for sure, you may be able to continue taking the pill up to age 55. The decision will depend on your health and if you have any medical conditions, such as a blood clot.    Do not smoke:  Nicotine and other chemicals in cigarettes and cigars increase your risk for a blood clot while you use birth control pills. The risk is higher if you are also 35 or older. Ask your healthcare provider for information if you currently smoke and need help to quit. E-cigarettes or smokeless tobacco still contain nicotine. Talk to your healthcare provider before you use  these products.  Follow up with your healthcare provider as directed:  Write down your questions so you remember to ask them during your visits.  © Copyright [employer] 2020 Information is for End User's use only and may not be sold, redistributed or otherwise used for commercial purposes. All illustrations and images included in CareNotes® are the copyrighted property of [employer]. or [employer]  The above information is an  only. It is not intended as medical advice for individual conditions or treatments. Talk to your doctor, nurse or pharmacist before following any medical regimen to see if it is safe and effective for you.       Patient Education     Lowering Your Risk of Breast Cancer   About this topic   Breast cancer is a serious illness. Breast cancer is when abnormal cells grow and divide more quickly in your breast. These cells form a growth or tumor. The abnormal cells may enter nearby tissue and spread to other parts of the body. It is the type of cancer most often seen in women. Men can have breast cancer, but it is a rare condition.  General   Some things in your life may increase your risk of breast cancer. You may not be able to change some of these. Others you can control.  You are more likely to get breast cancer if you:  Have a mother, sister, or daughter who has had breast cancer  Have used hormones for menopause for more than 5 years  Have had radiation therapy to the breast or chest in the past  Are overweight or do not exercise  Had your first menstrual period before you were 11 years old  Went through menopause after age 55  Have never been pregnant or had your first child after age 35  Have had breast cancer before  Drink alcohol in any form  Have dense breasts  Are older in age  There is no certain way to prevent breast cancer. There are things you can do to lower your chances of having breast cancer.  Keep a healthy weight. Lose weight if you are overweight.  Being overweight raises your chances of having breast cancer.  Eat a healthy diet to maintain a healthy weight, such as more fruits, vegetables, and lean cuts of meat. Decrease the amount of saturated fat in your diet.  Exercise. Being active helps you keep a healthy weight.  Limit your alcohol intake or do not drink alcohol. The more alcohol you drink, the higher your risk.  Do not smoke cigarettes. Smoking can increase your risk of many types of cancer.  Breastfeed your baby. This may help protect you. The longer you breastfeed, the more protection you have.  Talk with your doctor about:  Limiting or stopping hormone therapy.  Taking certain drugs to prevent breast cancer. For women at high risk of having breast cancer, there are a few drugs that may lower your risk.  Surgery to prevent you from having breast cancer if you are very high risk.  When do I need to call the doctor?   Changes in your breasts  A lump or area in your breast that feels different  Discharge from your nipple  Skin on your breast is dimpled or indented  You have questions or concerns about your breasts  Helpful tips   Talk to your doctor about the best kind of breast cancer screening for you.  If you want to do self breast exams, have your doctor show you the right way to do them.  Tell your doctor of any abnormal finding.  Last Reviewed Date   2021-10-04  Consumer Information Use and Disclaimer   This generalized information is a limited summary of diagnosis, treatment, and/or medication information. It is not meant to be comprehensive and should be used as a tool to help the user understand and/or assess potential diagnostic and treatment options. It does NOT include all information about conditions, treatments, medications, side effects, or risks that may apply to a specific patient. It is not intended to be medical advice or a substitute for the medical advice, diagnosis, or treatment of a health care provider based on the health care  provider's examination and assessment of a patient’s specific and unique circumstances. Patients must speak with a health care provider for complete information about their health, medical questions, and treatment options, including any risks or benefits regarding use of medications. This information does not endorse any treatments or medications as safe, effective, or approved for treating a specific patient. [employer] and its affiliates disclaim any warranty or liability relating to this information or the use thereof. The use of this information is governed by the Terms of Use, available at https://www.[employer]/en/know/clinical-effectiveness-terms   Copyright   Copyright © 2024 [employer] and its affiliates and/or licensors. All rights reserved.       Perineal Hygiene      Your vaginal naturally takes care of its self, it is a self washing system, the less you mess the healthier it will be     No soaps or feminine wash to the vulva, these products can cause dermitis, bacterial infections and other vulvar problems.   Use only water to cleanse, or water with Dove or Dove Sensitive Skin Bar soap if necessary.    Avoid the use of washcloths, exfoliating brenton's, netted scrubbers, loofa's, use your hands only to cleanse the vulvar tissues    No scented lotions or products are advised in or near your vulva.    Use coconut oil in its solid form for moisture if needed.  No douching this may cause imbalance in your vaginal PH and further issues.    If you wear panty liners, you may apply a thin coating of Vaseline, A&D ointment or coconut oil to the vulvar tissues as a skin barrier     Cotton underware, loose fitting clothing  Only perfume-free, dye-free laundry detergent, use a second rinse cycle   Avoid fabric softeners/dryer sheets.       Your partner should avoid the same products as well.       Over the counter probiotic to restore vaginal della may be helpful as well, take daily.       You may also  look into Boric Acid vaginal suppositories to restore vaginal PH balance for up to 2 weeks as directed on the box. You may not use these if you are pregnant      For vaginal dryness:      You may use:     Coconut oil in solid form, not liquid (organic, pure, unscented) as needed for moisture or lubrication. ( Do not use if allergic)       Replens moisture restore external comfort gel daily ( use as directed on the box)        Replens long lasting vaginal moisturizer  ( use as directed on the box)     May try NewLife vulvar moisturizer ( found on Amazon )    May try Revaree vaginal inserts        For Vaginal Lubrication:          You may use:     Coconut oil in solid form, not liquid (organic, pure, unscented) as a lubricant or another scent-free lubricant (Astroglide, Uberlube) if needed.  Do not use coconut oil or silicone if using a condom as this may break down the integrity of the condom and cause an unplanned pregnancy              Do not use coconut oil if allergic               Replens silky smooth lubricant, premium silicone based lubricant for intercourse. ( use as directed, a small amount will provide an enhanced natural feeling)     Any premium over the counter vaginal lubricant water or silicone based. Silicone based will have more staying power.        For Vulvar irritation/itching:        You may use Hydrocortisone 1 % over the counter to external vulvar tissues 2 x daily for 5-7 days to help with irriation and itch relief.  Patient Education     HPV (Human Papillomavirus) Vaccine CDC Vaccine Information Statement (VIS)   About this topic

## 2025-01-16 ENCOUNTER — ANNUAL EXAM (OUTPATIENT)
Dept: OBGYN CLINIC | Facility: CLINIC | Age: 30
End: 2025-01-16
Payer: COMMERCIAL

## 2025-01-16 VITALS
BODY MASS INDEX: 22.26 KG/M2 | DIASTOLIC BLOOD PRESSURE: 58 MMHG | HEIGHT: 62 IN | SYSTOLIC BLOOD PRESSURE: 102 MMHG | WEIGHT: 121 LBS

## 2025-01-16 DIAGNOSIS — Z01.419 ENCOUNTER FOR GYNECOLOGICAL EXAMINATION WITHOUT ABNORMAL FINDING: Primary | ICD-10-CM

## 2025-01-16 DIAGNOSIS — Z30.011 ENCOUNTER FOR INITIAL PRESCRIPTION OF CONTRACEPTIVE PILLS: ICD-10-CM

## 2025-01-16 DIAGNOSIS — Z71.85 HPV VACCINE COUNSELING: ICD-10-CM

## 2025-01-16 DIAGNOSIS — Z12.4 SCREENING FOR CERVICAL CANCER: ICD-10-CM

## 2025-01-16 PROCEDURE — 90651 9VHPV VACCINE 2/3 DOSE IM: CPT | Performed by: OBSTETRICS & GYNECOLOGY

## 2025-01-16 PROCEDURE — S0612 ANNUAL GYNECOLOGICAL EXAMINA: HCPCS | Performed by: OBSTETRICS & GYNECOLOGY

## 2025-01-16 PROCEDURE — 90471 IMMUNIZATION ADMIN: CPT | Performed by: OBSTETRICS & GYNECOLOGY

## 2025-01-16 PROCEDURE — G0145 SCR C/V CYTO,THINLAYER,RESCR: HCPCS | Performed by: OBSTETRICS & GYNECOLOGY

## 2025-01-16 RX ORDER — NORETHINDRONE ACETATE AND ETHINYL ESTRADIOL 1MG-20(21)
1 KIT ORAL DAILY
Qty: 90 TABLET | Refills: 0 | Status: SHIPPED | OUTPATIENT
Start: 2025-01-16

## 2025-01-16 NOTE — PROGRESS NOTES
Patient got her first dose of Gardasil vaccine toda at her annual  Given in L Deltoid  Tolerated well.  VIS given

## 2025-01-22 ENCOUNTER — RESULTS FOLLOW-UP (OUTPATIENT)
Dept: OBGYN CLINIC | Facility: CLINIC | Age: 30
End: 2025-01-22

## 2025-01-22 LAB
LAB AP GYN PRIMARY INTERPRETATION: NORMAL
Lab: NORMAL

## 2025-04-10 ENCOUNTER — CLINICAL SUPPORT (OUTPATIENT)
Dept: OBGYN CLINIC | Facility: CLINIC | Age: 30
End: 2025-04-10
Payer: COMMERCIAL

## 2025-04-10 VITALS
BODY MASS INDEX: 21.16 KG/M2 | DIASTOLIC BLOOD PRESSURE: 54 MMHG | WEIGHT: 115 LBS | SYSTOLIC BLOOD PRESSURE: 98 MMHG | HEIGHT: 62 IN

## 2025-04-10 DIAGNOSIS — Z23 NEED FOR HPV VACCINATION: Primary | ICD-10-CM

## 2025-04-10 PROCEDURE — 90651 9VHPV VACCINE 2/3 DOSE IM: CPT

## 2025-04-10 PROCEDURE — 90471 IMMUNIZATION ADMIN: CPT

## 2025-04-10 NOTE — PROGRESS NOTES
Patient is here for 2nd gardasil vaccine   No concerns at this time    LMP  3/16  Given Left Deltoid, per pt request     NDC 3900-6494-00  LOT # [id number]  EXP 2/10/2027

## 2025-04-11 DIAGNOSIS — Z30.011 ENCOUNTER FOR INITIAL PRESCRIPTION OF CONTRACEPTIVE PILLS: ICD-10-CM

## 2025-04-11 RX ORDER — NORETHINDRONE ACETATE AND ETHINYL ESTRADIOL AND FERROUS FUMARATE 1MG-20(21)
1 KIT ORAL DAILY
Qty: 84 TABLET | Refills: 1 | Status: SHIPPED | OUTPATIENT
Start: 2025-04-11

## 2025-08-14 ENCOUNTER — CLINICAL SUPPORT (OUTPATIENT)
Dept: OBGYN CLINIC | Facility: CLINIC | Age: 30
End: 2025-08-14
Payer: COMMERCIAL